# Patient Record
Sex: MALE | Race: WHITE | Employment: OTHER | URBAN - METROPOLITAN AREA
[De-identification: names, ages, dates, MRNs, and addresses within clinical notes are randomized per-mention and may not be internally consistent; named-entity substitution may affect disease eponyms.]

---

## 2023-08-21 ENCOUNTER — HOSPITAL ENCOUNTER (OUTPATIENT)
Dept: CT IMAGING | Facility: HOSPITAL | Age: 68
End: 2023-08-21
Attending: FAMILY MEDICINE

## 2023-08-21 VITALS — DIASTOLIC BLOOD PRESSURE: 78 MMHG | SYSTOLIC BLOOD PRESSURE: 132 MMHG

## 2023-08-21 DIAGNOSIS — Z13.9 ENCOUNTER FOR SCREENING: ICD-10-CM

## 2023-08-22 NOTE — PROGRESS NOTES
Date of Service 8/21/2023    Param Enriquez  Date of Birth 6/20/1955    Patient Age: 79year old    PCP: No primary care provider on file. Heart Scan Consult  Preliminary Heart Scan Score: 349.04    Previous Screening  Heart Scan Completed Previously: No        Peripheral Vascular Scan Completed Previously: No          Risk Factors  Personal Risk Factors  Non-alterable Risk Factors: Personal History;Age;Gender;Family History  Alterable Risk Factors: Smoking;High Blood Pressure; Abnormal Cholesterol;Lack of exercise;Stress;Unhealthy eating        Blood Pressure     /78 (BP Location: Right arm)     (Normal =< 120/80,  Elevated = 120-129/ >80,  High Stage1 130-139/80-89 , Stage2 >140/>90)    Lipid Profile  No Lipid results on file in last 5 years . Cholesterol Goals  Value   Total  =< 200   HDL  = > 45 Men = > 55 Women   LDL   =< 100   Triglycerides  =< 150       Glucose and Hemoglobin A1C  No results found for: GLU, A1C, A1C  (Normal Fasting Glucose < 100mg/dl )    Nurse Review  Risk factor information and results reviewed with Nurse: Yes    Recommended Follow Up:  Consult your physician regarding[de-identified]  Final Heart Scan Report; Discuss potential for Incidental Finding      Recommendations for Change:  Nutrition Changes: Low Saturated Fat;Low Fat Dairy; Increase Fiber    Cholesterol Modification (goal of therapy depends upon your risk): Increase HDL (Healthy/Good) Normal >45 Men >55 Women;  Decrease LDL (Lousy/Bad) Ideal <100;  Decrease Triglycerides (Ugly) Normal <150     (Today's Cholestech Values: Total Cholesterol-238, HDL-29, LDL-164, Triglycerides-223, Glucose-180)    Exercise: Enhance Current Program (Exercise as tolerated.)         Weight Management: Decrease Current Weight         Repeat Heart Scan:   3 Years if Calcium Score is > 0.0; Discuss with your Physician    [facility] link for sign-up emailed to [email or web address].               [facility] Recommended Resources:  Recommended Resources: Upcoming Classes, Medical Services and [facility]. [email or web address];    PV Screening  Recommended PV Screening: Abdomen; Ankle-Brachial Index (CHRIS); Carotids         Bre Sharp RN        Please Contact the Nurse Heart Line with any Questions or Concerns [phone].

## 2023-09-08 ENCOUNTER — TELEPHONE (OUTPATIENT)
Dept: FAMILY MEDICINE CLINIC | Facility: CLINIC | Age: 68
End: 2023-09-08

## 2023-09-08 NOTE — TELEPHONE ENCOUNTER
----- Message from Ryan Alanis MD sent at 9/7/2023  3:56 PM CDT -----  This is not my patient, remove from list